# Patient Record
Sex: MALE | Race: AMERICAN INDIAN OR ALASKA NATIVE | HISPANIC OR LATINO | URBAN - METROPOLITAN AREA
[De-identification: names, ages, dates, MRNs, and addresses within clinical notes are randomized per-mention and may not be internally consistent; named-entity substitution may affect disease eponyms.]

---

## 2024-11-05 ENCOUNTER — OFFICE VISIT (OUTPATIENT)
Dept: URBAN - METROPOLITAN AREA CLINIC 60 | Facility: CLINIC | Age: 34
End: 2024-11-05
Payer: SELF-PAY

## 2024-11-05 ENCOUNTER — DASHBOARD ENCOUNTERS (OUTPATIENT)
Age: 34
End: 2024-11-05

## 2024-11-05 ENCOUNTER — LAB OUTSIDE AN ENCOUNTER (OUTPATIENT)
Dept: URBAN - METROPOLITAN AREA CLINIC 60 | Facility: CLINIC | Age: 34
End: 2024-11-05

## 2024-11-05 VITALS
BODY MASS INDEX: 37.1 KG/M2 | TEMPERATURE: 97.8 F | DIASTOLIC BLOOD PRESSURE: 78 MMHG | SYSTOLIC BLOOD PRESSURE: 128 MMHG | OXYGEN SATURATION: 96 % | HEIGHT: 71 IN | WEIGHT: 265 LBS | HEART RATE: 98 BPM

## 2024-11-05 DIAGNOSIS — K57.92 DIVERTICULITIS: ICD-10-CM

## 2024-11-05 PROBLEM — 307496006: Status: ACTIVE | Noted: 2024-11-05

## 2024-11-05 PROCEDURE — 99203 OFFICE O/P NEW LOW 30 MIN: CPT

## 2024-11-05 RX ORDER — LISINOPRIL 5 MG/1
TAKE 1 TABLET BY MOUTH ONCE DAILY IN THE MORNING TABLET ORAL
Qty: 90 EACH | Refills: 1 | Status: ACTIVE | COMMUNITY

## 2024-11-05 RX ORDER — FENOFIBRATE 160 MG/1
TAKE 1 TABLET BY MOUTH ONCE DAILY WITH SUPPER TABLET ORAL
Qty: 90 EACH | Refills: 1 | Status: ACTIVE | COMMUNITY

## 2024-11-05 RX ORDER — PIOGLITAZONE HYDROCHLORIDE 30 MG/1
TAKE 1 TABLET BY MOUTH ONCE DAILY IN THE MORNING TABLET ORAL
Qty: 90 EACH | Refills: 1 | Status: ACTIVE | COMMUNITY

## 2024-11-05 RX ORDER — METFORMIN HYDROCHLORIDE 1000 MG/1
TAKE 1 TABLET BY MOUTH TWICE DAILY BEFORE MEAL(S) TABLET ORAL
Qty: 180 EACH | Refills: 1 | Status: ACTIVE | COMMUNITY

## 2024-11-05 NOTE — HPI-PREVIOUS LABS
11/3/2024 labs - CMP within normal limits with exception of elevated Kos of 138, elevated anion gap of 16, elevated total protein of 8.7, elevated ALT of 55. - Lipase within normal limits. - CBC within normal limits with exception of elevated white blood cell count of 13.8, elevated absolute neutrophils of 10.2.

## 2024-11-05 NOTE — HPI-TODAY'S VISIT:
This is a pleasant 34-year-old male presenting for hospital follow-up after episode of diverticulitis. Medical history significant for diabetes.   He was seen at [facility] ER on 11/3/2024 for complaints of right flank and lower abdominal pain associated with diarrhea.  CT scan demonstrated acute diverticulitis in the sigmoid colon.  No abscess or perforation formation.  He was started on Augmentin and Flagyl.  Today, he presents in good general state. Reports improvement in symptoms since ER visit. Denies abdominal pain at this time. Reports unremarkable bowel movements. Denies fever, chills, nausea, or vomiting. He has been on antibiotics and clear liquid diet since Sunday.   Currently has no GI complaints, questions, concerns. Reports maternal aunt and paternal aunt have a history of colon cancer. Reports his maternal aunt passed away at 46 years old due to colon cancer. Denies melena, hematochezia, bright red blood per rectum, nausea/vomiting, hematemesis, dysphagia, reflux, abdominal pain, constipation/diarrhea/change in bowels, change in stool caliber, unintentional weight loss. Denies a history of stroke, heart attack, pacemaker, defibrillator, stents, blood thinners, COPD, asthma, sleep apnea, chronic kidney disease, seizures.

## 2024-11-05 NOTE — HPI-PREVIOUS IMAGING
11/3/24 CT renal stone protocol  - findings compatible with acute diverticulitis in the sigmoid colon.  No perforation or abscess formation.

## 2025-02-07 ENCOUNTER — OFFICE VISIT (OUTPATIENT)
Dept: URBAN - METROPOLITAN AREA SURGERY CENTER 4 | Facility: SURGERY CENTER | Age: 35
End: 2025-02-07

## 2025-03-17 ENCOUNTER — OFFICE VISIT (OUTPATIENT)
Dept: URBAN - METROPOLITAN AREA CLINIC 63 | Facility: CLINIC | Age: 35
End: 2025-03-17